# Patient Record
Sex: FEMALE | Race: WHITE | URBAN - METROPOLITAN AREA
[De-identification: names, ages, dates, MRNs, and addresses within clinical notes are randomized per-mention and may not be internally consistent; named-entity substitution may affect disease eponyms.]

---

## 2017-01-03 ENCOUNTER — OFFICE VISIT (OUTPATIENT)
Dept: FAMILY MEDICINE | Facility: CLINIC | Age: 22
End: 2017-01-03
Payer: COMMERCIAL

## 2017-01-03 VITALS
WEIGHT: 116.9 LBS | SYSTOLIC BLOOD PRESSURE: 104 MMHG | TEMPERATURE: 97.6 F | HEART RATE: 60 BPM | BODY MASS INDEX: 18.79 KG/M2 | DIASTOLIC BLOOD PRESSURE: 70 MMHG | HEIGHT: 66 IN

## 2017-01-03 DIAGNOSIS — R53.83 FATIGUE, UNSPECIFIED TYPE: ICD-10-CM

## 2017-01-03 DIAGNOSIS — M21.42 FLAT FEET, BILATERAL: ICD-10-CM

## 2017-01-03 DIAGNOSIS — Z11.3 SCREENING EXAMINATION FOR VENEREAL DISEASE: Primary | ICD-10-CM

## 2017-01-03 DIAGNOSIS — F41.1 GENERALIZED ANXIETY DISORDER: ICD-10-CM

## 2017-01-03 DIAGNOSIS — M21.41 FLAT FEET, BILATERAL: ICD-10-CM

## 2017-01-03 LAB
MICRO REPORT STATUS: NORMAL
SPECIMEN SOURCE: NORMAL
T3FREE SERPL-MCNC: 2.5 PG/ML (ref 2.3–4.2)
WET PREP SPEC: NORMAL

## 2017-01-03 PROCEDURE — 87591 N.GONORRHOEAE DNA AMP PROB: CPT | Mod: 90 | Performed by: FAMILY MEDICINE

## 2017-01-03 PROCEDURE — 86803 HEPATITIS C AB TEST: CPT | Mod: 90 | Performed by: FAMILY MEDICINE

## 2017-01-03 PROCEDURE — 99214 OFFICE O/P EST MOD 30 MIN: CPT | Performed by: FAMILY MEDICINE

## 2017-01-03 PROCEDURE — 84481 FREE ASSAY (FT-3): CPT | Mod: 90 | Performed by: FAMILY MEDICINE

## 2017-01-03 PROCEDURE — 84439 ASSAY OF FREE THYROXINE: CPT | Performed by: FAMILY MEDICINE

## 2017-01-03 PROCEDURE — 86780 TREPONEMA PALLIDUM: CPT | Mod: 90 | Performed by: FAMILY MEDICINE

## 2017-01-03 PROCEDURE — 99000 SPECIMEN HANDLING OFFICE-LAB: CPT | Performed by: FAMILY MEDICINE

## 2017-01-03 PROCEDURE — 86376 MICROSOMAL ANTIBODY EACH: CPT | Mod: 90 | Performed by: FAMILY MEDICINE

## 2017-01-03 PROCEDURE — 84443 ASSAY THYROID STIM HORMONE: CPT | Performed by: FAMILY MEDICINE

## 2017-01-03 PROCEDURE — 87491 CHLMYD TRACH DNA AMP PROBE: CPT | Mod: 90 | Performed by: FAMILY MEDICINE

## 2017-01-03 PROCEDURE — 87389 HIV-1 AG W/HIV-1&-2 AB AG IA: CPT | Mod: 90 | Performed by: FAMILY MEDICINE

## 2017-01-03 PROCEDURE — 36415 COLL VENOUS BLD VENIPUNCTURE: CPT | Performed by: FAMILY MEDICINE

## 2017-01-03 PROCEDURE — 87210 SMEAR WET MOUNT SALINE/INK: CPT | Performed by: FAMILY MEDICINE

## 2017-01-03 RX ORDER — SERTRALINE HYDROCHLORIDE 25 MG/1
25 TABLET, FILM COATED ORAL DAILY
Qty: 90 TABLET | Refills: 1 | Status: SHIPPED | OUTPATIENT
Start: 2017-01-03 | End: 2017-09-07

## 2017-01-03 NOTE — Clinical Note
Please abstract the following data from this visit with this patient into the appropriate field in Epic:  Pap smear done on this date: 12/2016 (approximately), by this group: [facility], results were nml (ASCUS).  All records in care everywhere

## 2017-01-03 NOTE — NURSING NOTE
"Chief Complaint   Patient presents with     Thyroid Disease     STD     /70 mmHg  Pulse 60  Temp(Src) 97.6  F (36.4  C) (Oral)  Ht 5' 6\" (1.676 m)  Wt 116 lb 14.4 oz (53.025 kg)  BMI 18.88 kg/m2 Estimated body mass index is 18.88 kg/(m^2) as calculated from the following:    Height as of this encounter: 5' 6\" (1.676 m).    Weight as of this encounter: 116 lb 14.4 oz (53.025 kg).  bp completed using cuff size: regular      Health Maintenance that is potentially due pending provider review:  Pap Smear    Sent to abstracting  Leonela Bird M.A.    "

## 2017-01-03 NOTE — PROGRESS NOTES
"  SUBJECTIVE:                                                    Indira Whiting is a 21 year old female who presents to clinic today for the following health issues:      Pt is here for thyroid check, she would like to get this checked to make sure it is still in range and also get a T4 and maybe T3.  Had TSH 3.75 done recently but worries that she has been symptomatic.  Some depression and anxiety and some lethargy  No weight changes  No skin or hair changes  Was taking sertraline - but Had gone off over the past 6 months   Just restarted the sertraline 25mg 1 week ago.     Pt would std screening.  Pt was tx for chlamydia in December (3 weeks ago) and would like to make sure it is gone.  Had found on home test.    Flat feet -   Was living in [facility] and had met a few times with podiatry -   Has tried OTC inserts but was told she may benefit from custom orthotics  She has foot and b/l knee pain from the flat feet.      -------------------------------------    Problem list and histories reviewed & adjusted, as indicated.  Additional history: as documented    Problem list, Medication list, Allergies, and Medical/Social/Surgical histories reviewed in [facility] and updated as appropriate.    ROS:  Constitutional, HEENT, cardiovascular, pulmonary, gi and gu systems are negative, except as otherwise noted.    OBJECTIVE:                                                    /70 mmHg  Pulse 60  Temp(Src) 97.6  F (36.4  C) (Oral)  Ht 5' 6\" (1.676 m)  Wt 116 lb 14.4 oz (53.025 kg)  BMI 18.88 kg/m2  Body mass index is 18.88 kg/(m^2).  GENERAL: healthy, alert and no distress  NECK: no adenopathy, no asymmetry, masses, or scars and thyroid normal to palpation   (female): normal female external genitalia, normal urethral meatus, vaginal mucosa, normal cervix/adnexa/uterus without masses or discharge    Diagnostic Test Results:  Results for orders placed or performed in visit on 01/03/17 (from the past 24 hour(s))   Wet prep "   Result Value Ref Range    Specimen Description Vagina     Wet Prep       No Trichomonas seen  No clue cells seen  No yeast seen      Micro Report Status FINAL 01/03/2017         ASSESSMENT/PLAN:                                                    1. Screening examination for venereal disease  STD testing  Will recheck chlamydia - recent treatment  - Chlamydia trachomatis PCR  - Neisseria gonorrhoeae PCR  - HIV Antigen Antibody Combo  - Wet prep  - Hepatitis C Screen Reflex to HCV RNA Quant and Genotype  - Anti Treponema    2. Generalized anxiety disorder  Pt restarted zoloft - had been on for a long time  Will refill   - sertraline (ZOLOFT) 25 MG tablet; Take 1 tablet (25 mg) by mouth daily  Dispense: 90 tablet; Refill: 1    3. Fatigue, unspecified type  Recent TSH done and higher side of normal -   Will recheck all tests  - T3, Free  - T4, free  - TSH  - Thyroid peroxidase antibody    4. Flat feet, bilateral  B/l foot pain due to flat feet -   Interested in seeing podiatry to discuss custom orthotics          Laureen Aguila,   [facility]

## 2017-01-04 LAB
C TRACH DNA SPEC QL NAA+PROBE: NORMAL
HCV AB SERPL QL IA: NORMAL
HIV 1+2 AB+HIV1 P24 AG SERPL QL IA: NORMAL
N GONORRHOEA DNA SPEC QL NAA+PROBE: NORMAL
SPECIMEN SOURCE: NORMAL
SPECIMEN SOURCE: NORMAL
T PALLIDUM IGG+IGM SER QL: NEGATIVE
T4 FREE SERPL-MCNC: 0.99 NG/DL (ref 0.76–1.46)
THYROPEROXIDASE AB SERPL-ACNC: <10 IU/ML
TSH SERPL DL<=0.05 MIU/L-ACNC: 0.88 MU/L (ref 0.4–4)

## 2017-01-04 NOTE — PROGRESS NOTES
Quick Note:    Dear Indira,   Your test results are all back -   -All of your labs are normal.  Let us know if you have any questions.  -Laureen Aguila, DO    ______

## 2017-01-12 ENCOUNTER — OFFICE VISIT (OUTPATIENT)
Dept: PODIATRY | Facility: CLINIC | Age: 22
End: 2017-01-12
Payer: COMMERCIAL

## 2017-01-12 VITALS
WEIGHT: 116 LBS | SYSTOLIC BLOOD PRESSURE: 118 MMHG | BODY MASS INDEX: 18.64 KG/M2 | DIASTOLIC BLOOD PRESSURE: 64 MMHG | HEIGHT: 66 IN

## 2017-01-12 DIAGNOSIS — M21.41 PES PLANUS OF BOTH FEET: Primary | ICD-10-CM

## 2017-01-12 DIAGNOSIS — M25.561 PAIN IN BOTH KNEES, UNSPECIFIED CHRONICITY: ICD-10-CM

## 2017-01-12 DIAGNOSIS — M25.562 PAIN IN BOTH KNEES, UNSPECIFIED CHRONICITY: ICD-10-CM

## 2017-01-12 DIAGNOSIS — M21.42 PES PLANUS OF BOTH FEET: Primary | ICD-10-CM

## 2017-01-12 PROCEDURE — 99242 OFF/OP CONSLTJ NEW/EST SF 20: CPT | Performed by: PODIATRIST

## 2017-01-12 NOTE — PROGRESS NOTES
"Foot & Ankle Surgery  January 12, 2017    CC: pes planus; knee, hip and lower back pain    I was asked to see Indira Henok by Dr GORDON Aguila for pes planus and resulting knee, hip and lower back pain.    HPI:  Pt is a 21 year old female who presents with above complaint.  2 year history of problems with flat feet, including \"ache in inner knees, tightness(but not pain) in hips\".  She has tried some OTC arch supports with insufficient relief in symptoms.  She's interested in custom orthotics    ROS:   Pos for CC.  The patient denies current nausea, vomiting, chills, fevers, belly pain, calf pain, chest pain or SOB.  Complete remainder of ROS is otherwise neg.    VITALS:    Filed Vitals:    01/12/17 1344   BP: 118/64   Height: 5' 6\" (1.676 m)   Weight: 116 lb (52.617 kg)       PMH:    Past Medical History   Diagnosis Date     BATSHEVA (generalised anxiety disorder) 1/6/2012     Numbness or tingling 1/6/2012     ASCUS on Pap smear 6/2014     age 19       SXHX:    Past Surgical History   Procedure Laterality Date     Mouth surgery  Winter of 2013     [employer] teeth        MEDS:    Current Outpatient Prescriptions   Medication     sertraline (ZOLOFT) 25 MG tablet     No current facility-administered medications for this visit.       ALL:   No Known Allergies    FMH:    Family History   Problem Relation Age of Onset     Psychotic Disorder Father      mild anxiety     Asthma Father        SocHx:    Social History     Social History     Marital Status: Single     Spouse Name: N/A     Number of Children: 0     Years of Education: N/A     Occupational History     Not on file.     Social History Main Topics     Smoking status: Never Smoker      Smokeless tobacco: Never Used     Alcohol Use: 0.0 oz/week     0 Standard drinks or equivalent per week      Comment: 2 drinks/week on average, varies, depending if she is at school or not     Drug Use: Yes     Special: Marijuana      Comment: currently using marijuana once per month on " average,mushrooms(once)     Sexual Activity:     Partners: Male     Birth Control/ Protection: Condom     Other Topics Concern      Service No     Blood Transfusions No     Caffeine Concern No     Occupational Exposure No     Hobby Hazards No     Sleep Concern No     Stress Concern No     Weight Concern No     Special Diet No     Back Care No     Exercise Yes     Bike Helmet Yes     Seat Belt Yes     Self-Exams No     Parent/Sibling W/ Cabg, Mi Or Angioplasty Before 65f 55m? No     Social History Narrative    IN SCHOOL IN [place]. Parents in [place]                   EXAMINATION:  Gen:   No apparent distress  Neuro:   A&Ox3, no deficits  Psych:    Answering questions appropriately for age and situation with normal affect  Head:    NCAT  Eye:    Visual scanning without deficit  Ear:    Response to auditory stimuli wnl  Lung:    Non-labored breathing on RA noted  Abd:    NTND per patient report  Lymph:    Neg for pitting/non-pitting edema BLE  Vasc:    Pulses palpable, CFT minimally delayed  Neuro:    Light touch sensation intact to all sensory nerve distributions without paresthesias  Derm:    Neg for nodules, lesions or ulcerations  MSK:    Bilateral pes planus, calcaneal valgus, and gastroc equinus.  Unable to elicit any symptoms in feet/ankles.  States she has some knee and hip pain, these were not examined today  Calf:    Neg for redness, swelling or tenderness    Assessment:  21 year old female with pes planus/equinus; knee, hip pain      Plan:  Discussed etiologies and options  1.  Pes planus/equinsu  -Orthotic Lab referral for custom orthotics with medial heel skive for support    2.  Knee, hip pain  -custom orthotics  -discussed they may be unrelated to pes planus  -if symptoms persist, discussed Sports Med and/or PT referral    Follow up:  Prn       Patient's medical history was reviewed today    Body mass index is 18.73 kg/(m^2).          Jacobo Shah DPM   Podiatric Foot & Ankle Surgeon  [place]  [facility]  [phone]

## 2017-01-12 NOTE — PATIENT INSTRUCTIONS
Follow Up - as needed    Dr. Shah's Clinic Locations:         Monday Tuesday   [facility]   [address], [address]   [place] [address] [place] [address]   [phone]       Wednesday:  Surgery Day    Surgery Scheduling Line - [phone]       Thursday Morning Thursday Afternoon   [facility]   [address]   [place] [address] [place], [place] [address] [phone]       Friday Morning To Schedule an Appointment    [facility] Call: [phone] [address] [place] [address] [phone] PLEASE FAX ALL FORMS TO: [phone]     Orthotic & Prosthetic Clinic Locations  [facility]  [address]  [place] [address]  Phone: [phone]  Fax: [phone] [facility]  [address]  [place] [address]  Phone: [phone]   Fax: [phone]   [facility]  [address]  [place] [address]  Phone: [phone]  Fax: [phone] [facility]  [address] [place] [address] [address]  [place] [address]  Phone: [phone]   Fax: [phone]   [facility]   [address]  [place] [address] [place] [address]  Phone: [phone]   Fax: [phone] * Please call an location listed to make an appointment for a casting/fitting. Your referral was sent to their central office and they will all have the order on file.

## 2017-01-12 NOTE — MR AVS SNAPSHOT
After Visit Summary   1/12/2017    Indira Whiting    MRN: [id number]           Patient Information     Date Of Birth          1995        Visit Information        Provider Department      1/12/2017 1:45 PM Jacobo Shah DPM [facility]        Today's Diagnoses     Pes planus of both feet    -  1     Pain in both knees, unspecified chronicity           Care Instructions    Follow Up - as needed    Dr. Shah's Clinic Locations:         Monday Tuesday   [facility]   [address]   [place] [address] [place] [address]   [phone]       Wednesday:  Surgery Day    Surgery Scheduling Line - [phone]       Thursday Morning Thursday Afternoon   [facility]   [address]   [place] [address] [place] [address] [phone]       Friday Morning To Schedule an Appointment    [facility] Call: [phone] [address] [place] [address] [phone] PLEASE FAX ALL FORMS TO: [phone]     Orthotic & Prosthetic Clinic Locations  [facility]  [address]  [place] [address]  Phone: [phone]  Fax: [phone] [facility]  [address]  [place] [address]  Phone: [phone]   Fax: [phone]   [facility]  [address]  [place] [address]  Phone: [phone]  Fax: [phone] [facility]  [address]  [place] [address]  Phone: [phone]   Fax: [phone]   [facility]   [address]  [place] [address] [place] [address]  Phone: [phone]   Fax: [phone] * Please call an location listed to make an appointment for a  casting/fitting. Your referral was sent to their central office and they will all have the order on file.             Follow-ups after your visit        Additional Services     ORTHOTICS REFERRAL       Please be aware that coverage of these services is subject to the terms and limitations of your health insurance plan.  Call member services at your health plan with any benefit or coverage questions.      Please bring the following to your appointment:    >>   Any x-rays, CTs or MRIs which have been performed.  Contact the facility where they were done to arrange for  prior to your scheduled appointment.  Any new CT, MRI or other procedures ordered by your specialist must be performed at a [facility] facility or coordinated by your clinic's referral office.    >>   List of current medications   >>   This referral request   >>   Any documents/labs given to you for this referral    ==This Referral PRINTS in the [facility] (ORTHOTICS & PROSTHETICS) Central scheduling office ==     The [facility] Central Scheduling staff will contact patient to arrange appointments. Central Scheduling Phone #:  [place], [place]  [phone]     Orthotics: Foot Orthotics - custom functional orthotics with offloading for sesamoid apparatus and small medial heel skive for medial heel support.                  Who to contact     If you have questions or need follow up information about today's clinic visit or your schedule please contact [facility] directly at [phone].  Normal or non-critical lab and imaging results will be communicated to you by MyChart, letter or phone within 4 business days after the clinic has received the results. If you do not hear from us within 7 days, please contact the clinic through MyChart or phone. If you have a critical or abnormal lab result, we will notify you by phone as soon as possible.  Submit refill requests through [employer]. or call your pharmacy and they will  "forward the refill request to us. Please allow 3 business days for your refill to be completed.          Additional Information About Your Visit        [employer] Information     [employer] gives you secure access to your electronic health record. If you see a primary care provider, you can also send messages to your care team and make appointments. If you have questions, please call your primary care clinic.  If you do not have a primary care provider, please call [phone] and they will assist you.        Care EveryWhere ID     This is your Care EveryWhere ID. This could be used by other organizations to access your [facility] medical records  [id number]        Your Vitals Were     Height BMI (Body Mass Index)                5' 6\" (1.676 m) 18.73 kg/m2           Blood Pressure from Last 3 Encounters:   01/12/17 118/64   01/03/17 104/70   06/09/15 131/76    Weight from Last 3 Encounters:   01/12/17 116 lb (52.617 kg)   01/03/17 116 lb 14.4 oz (53.025 kg)   06/09/15 118 lb (53.524 kg)              We Performed the Following     ORTHOTICS REFERRAL        Primary Care Provider    None       No address on file        Thank you!     Thank you for choosing [facility]  for your care. Our goal is always to provide you with excellent care. Hearing back from our patients is one way we can continue to improve our services. Please take a few minutes to complete the written survey that you may receive in the mail after your visit with us. Thank you!             Your Updated Medication List - Protect others around you: Learn how to safely use, store and throw away your medicines at www.disposemymeds.org.          This list is accurate as of: 1/12/17  2:00 PM.  Always use your most recent med list.                   Brand Name Dispense Instructions for use    sertraline 25 MG tablet    ZOLOFT    90 tablet    Take 1 tablet (25 mg) by mouth daily         "

## 2017-01-12 NOTE — Clinical Note
Good afternoon  I saw Indira today for pes planus and knee/hip pain.  She was given a referral to the Orthotic Lab referral for custom orthotics.  We also discussed possible Sports Med referral if knee/hip pain persists.  She'll follow up prn  Thanks  Jacobo

## 2017-01-12 NOTE — NURSING NOTE
"Chief Complaint   Patient presents with     Foot Problems     flat feet, would like orthotics       Initial /64 mmHg  Ht 5' 6\" (1.676 m)  Wt 116 lb (52.617 kg)  BMI 18.73 kg/m2 Estimated body mass index is 18.73 kg/(m^2) as calculated from the following:    Height as of this encounter: 5' 6\" (1.676 m).    Weight as of this encounter: 116 lb (52.617 kg).  BP completed using cuff size: regular    "

## 2017-09-07 DIAGNOSIS — F41.1 GENERALIZED ANXIETY DISORDER: ICD-10-CM

## 2017-09-07 RX ORDER — SERTRALINE HYDROCHLORIDE 25 MG/1
TABLET, FILM COATED ORAL
Qty: 90 TABLET | Refills: 0 | Status: SHIPPED | OUTPATIENT
Start: 2017-09-07

## 2017-09-07 NOTE — TELEPHONE ENCOUNTER
Prescription approved per [facility] Refill Protocol.  Takes for anxiety.  Masha Angulo RN

## 2017-09-07 NOTE — TELEPHONE ENCOUNTER
Pending Prescriptions:                       Disp   Refills    sertraline (ZOLOFT) 25 MG tablet [Pharmacy*90 tab*0        Sig: TAKE 1 TABLET BY MOUTH DAILY       Last Written Prescription Date: 1/3/2017  Last Fill Quantity: 90, # refills: 1  Last Office Visit with [facility] primary care provider:  1/3/2017        Last PHQ-9 score on record= No flowsheet data found.

## 2017-12-08 DIAGNOSIS — F41.1 GENERALIZED ANXIETY DISORDER: ICD-10-CM

## 2017-12-08 RX ORDER — SERTRALINE HYDROCHLORIDE 25 MG/1
TABLET, FILM COATED ORAL
Qty: 30 TABLET | Refills: 0 | Status: SHIPPED | OUTPATIENT
Start: 2017-12-08 | End: 2018-01-03

## 2017-12-08 NOTE — TELEPHONE ENCOUNTER
Medication is being filled for 1 time refill only due to:  Patient needs to be seen because due for physica/yearly appt.   Kristin PAINTER RN    Requested Prescriptions   Pending Prescriptions Disp Refills     sertraline (ZOLOFT) 25 MG tablet [Pharmacy Med Name: SERTRALINE 25MG TABLETS] 90 tablet 0     Sig: TAKE 1 TABLET BY MOUTH DAILY    SSRIs Protocol Passed    12/8/2017  7:45 AM       Passed - Recent or future visit with authorizing provider    Patient had office visit in the last year or has a visit in the next 30 days with authorizing provider.  See chart review.              Passed - Medication is NOT Bupropion    If the medication is Bupropion (Wellbutrin), and the patient is taking for smoking cessation; OK to refill.         Passed - Patient is age 18 or older       Passed - No active pregnancy on record       Passed - No positive pregnancy test in last 12 months

## 2018-01-03 DIAGNOSIS — F41.1 GENERALIZED ANXIETY DISORDER: ICD-10-CM

## 2018-01-03 NOTE — TELEPHONE ENCOUNTER
Requested Prescriptions   Pending Prescriptions Disp Refills     sertraline (ZOLOFT) 25 MG tablet [Pharmacy Med Name: SERTRALINE 25MG TABLETS] 30 tablet 0     Sig: TAKE 1 TABLET BY MOUTH DAILY    SSRIs Protocol Failed    1/3/2018 12:45 PM       Failed - Recent or future visit with authorizing provider    Patient had office visit in the last year or has a visit in the next 30 days with authorizing provider.  See chart review.              Passed - Patient is age 18 or older       Passed - No active pregnancy on record       Passed - No positive pregnancy test in last 12 months

## 2018-01-04 RX ORDER — SERTRALINE HYDROCHLORIDE 25 MG/1
TABLET, FILM COATED ORAL
Qty: 30 TABLET | Refills: 0 | Status: SHIPPED | OUTPATIENT
Start: 2018-01-04 | End: 2018-02-09

## 2018-01-04 NOTE — TELEPHONE ENCOUNTER
Left non detailed VM for pt asking that they callback and schedule (physical)  Kristin PAINTER RN

## 2018-01-20 ENCOUNTER — HEALTH MAINTENANCE LETTER (OUTPATIENT)
Age: 23
End: 2018-01-20

## 2018-02-09 DIAGNOSIS — F41.1 GENERALIZED ANXIETY DISORDER: ICD-10-CM

## 2018-02-12 NOTE — TELEPHONE ENCOUNTER
"Left non detailed VM for pt asking that they callback and schedule  1 month Rx sent 1/4/2018  Had physical scheduled for 1/17/2018 - cancelled  Kristin PAINTER RN    Requested Prescriptions   Pending Prescriptions Disp Refills     sertraline (ZOLOFT) 25 MG tablet [Pharmacy Med Name: SERTRALINE 25MG TABLETS] 30 tablet 0     Sig: TAKE 1 TABLET BY MOUTH DAILY.    SSRIs Protocol Failed    2/9/2018 11:02 PM       Failed - Recent or future visit with authorizing provider    Patient had office visit in the last year or has a visit in the next 30 days with authorizing provider.  See \"Patient Info\" tab in inbasket, or \"Choose Columns\" in Meds & Orders section of the refill encounter.            Passed - Patient is age 18 or older       Passed - No active pregnancy on record       Passed - No positive pregnancy test in last 12 months              "

## 2018-02-12 NOTE — TELEPHONE ENCOUNTER
PN,   said patient called back and has [facility]  Can no longer be seen at [facility]  Pt wondering if she can get another 1 month supply until can see new PCP  Please authorize if appropriate.  Thanks,  Kristin PAINTER RN

## 2018-02-13 RX ORDER — SERTRALINE HYDROCHLORIDE 25 MG/1
TABLET, FILM COATED ORAL
Qty: 30 TABLET | Refills: 0 | Status: SHIPPED | OUTPATIENT
Start: 2018-02-13

## 2018-02-13 NOTE — TELEPHONE ENCOUNTER
Patient informed Rx sent  States she has appt to establish care elsewhere on 2/16/2018  Kristin PAINTER RN

## 2019-09-28 ENCOUNTER — HEALTH MAINTENANCE LETTER (OUTPATIENT)
Age: 24
End: 2019-09-28

## 2021-01-10 ENCOUNTER — HEALTH MAINTENANCE LETTER (OUTPATIENT)
Age: 26
End: 2021-01-10

## 2021-10-23 ENCOUNTER — HEALTH MAINTENANCE LETTER (OUTPATIENT)
Age: 26
End: 2021-10-23

## 2022-02-12 ENCOUNTER — HEALTH MAINTENANCE LETTER (OUTPATIENT)
Age: 27
End: 2022-02-12

## 2022-10-10 ENCOUNTER — HEALTH MAINTENANCE LETTER (OUTPATIENT)
Age: 27
End: 2022-10-10

## 2023-02-18 ENCOUNTER — HEALTH MAINTENANCE LETTER (OUTPATIENT)
Age: 28
End: 2023-02-18

## 2024-03-16 ENCOUNTER — HEALTH MAINTENANCE LETTER (OUTPATIENT)
Age: 29
End: 2024-03-16